# Patient Record
Sex: MALE | URBAN - METROPOLITAN AREA
[De-identification: names, ages, dates, MRNs, and addresses within clinical notes are randomized per-mention and may not be internally consistent; named-entity substitution may affect disease eponyms.]

---

## 2024-09-30 ENCOUNTER — APPOINTMENT (RX ONLY)
Dept: URBAN - METROPOLITAN AREA CLINIC 28 | Facility: CLINIC | Age: 29
Setting detail: DERMATOLOGY
End: 2024-09-30

## 2024-09-30 DIAGNOSIS — Q828 OTHER SPECIFIED ANOMALIES OF SKIN: ICD-10-CM | Status: STABLE

## 2024-09-30 DIAGNOSIS — Q819 OTHER SPECIFIED ANOMALIES OF SKIN: ICD-10-CM | Status: STABLE

## 2024-09-30 DIAGNOSIS — B07.8 OTHER VIRAL WARTS: ICD-10-CM | Status: INADEQUATELY CONTROLLED

## 2024-09-30 DIAGNOSIS — L30.1 DYSHIDROSIS [POMPHOLYX]: ICD-10-CM | Status: STABLE

## 2024-09-30 DIAGNOSIS — Q826 OTHER SPECIFIED ANOMALIES OF SKIN: ICD-10-CM | Status: STABLE

## 2024-09-30 DIAGNOSIS — D22 MELANOCYTIC NEVI: ICD-10-CM

## 2024-09-30 PROBLEM — L85.8 OTHER SPECIFIED EPIDERMAL THICKENING: Status: ACTIVE | Noted: 2024-09-30

## 2024-09-30 PROBLEM — D48.5 NEOPLASM OF UNCERTAIN BEHAVIOR OF SKIN: Status: ACTIVE | Noted: 2024-09-30

## 2024-09-30 PROCEDURE — ? PATIENT EDUCATION

## 2024-09-30 PROCEDURE — ? COUNSELING

## 2024-09-30 PROCEDURE — ? OBSERVATION AND MEASURE

## 2024-09-30 PROCEDURE — ? PHOTO-DOCUMENTATION

## 2024-09-30 PROCEDURE — 99214 OFFICE O/P EST MOD 30 MIN: CPT

## 2024-09-30 ASSESSMENT — LOCATION DETAILED DESCRIPTION DERM
LOCATION DETAILED: LEFT PROXIMAL POSTERIOR UPPER ARM
LOCATION DETAILED: LEFT ANTERIOR PROXIMAL THIGH
LOCATION DETAILED: RIGHT ANTERIOR PROXIMAL THIGH
LOCATION DETAILED: LEFT KNEE
LOCATION DETAILED: LEFT INFERIOR LATERAL UPPER BACK
LOCATION DETAILED: RIGHT PROXIMAL POSTERIOR UPPER ARM

## 2024-09-30 ASSESSMENT — LOCATION SIMPLE DESCRIPTION DERM
LOCATION SIMPLE: LEFT THIGH
LOCATION SIMPLE: RIGHT UPPER ARM
LOCATION SIMPLE: LEFT UPPER BACK
LOCATION SIMPLE: LEFT KNEE
LOCATION SIMPLE: LEFT UPPER ARM
LOCATION SIMPLE: RIGHT THIGH

## 2024-09-30 ASSESSMENT — LOCATION ZONE DERM
LOCATION ZONE: LEG
LOCATION ZONE: TRUNK
LOCATION ZONE: ARM

## 2024-09-30 NOTE — HPI: OTHER
Condition:: Lesion
Please Describe Your Condition:: is an established patient who is being seen for a chief complaint of Lesion. Patient reports a mole on his left mid lateral back that has been there for a long time. He thinks we might have removed it in the past? But it is raised now and he would like her to make sure it is okay. No symptoms reported. His wife says it is the one that looks like a tiny sharpy point that needs to be looked at.
Condition:: Lesions
Please Describe Your Condition:: is an established patient who is being seen for a chief complaint of Lesions. Patient reports some small bumps on the left knee that come and go. He is not sure if they are warts or not. But he has had a history of warts in the past. He has had a habit of scratching and picking at these. He has not treated any of these on his knee area. No symptoms.
Condition:: Possible dyshidrosis
Please Describe Your Condition:: The patient recently had a tiny bump come up on the side of his right index finger that he thinks was stress related, but it went away now and left just a pink spot behind. He does have a history of eczema.
Condition:: Keratosis Pilaris
Please Describe Your Condition:: The patient has a history of eczema and wonders if small bumps on the backs of his arms and the fronts of his proximal thighs are eczema. No symptoms.

## 2024-09-30 NOTE — PROCEDURE: COUNSELING
Detail Level: Detailed
Patient Specific Counseling (Will Not Stick From Patient To Patient): -\\n\\nReviewed that it really does look okay today.. \\nObservation discussed and recommended for now, vs removal. The patient does agree with observation.\\n\\nContinue to observe with his wife and return to the clinic with any changes at all. Reviewed the back is a high risk area for men to get melanoma, so monthly SSEs and consider q year derm FBSE. \\n\\nNo sign of any scar to suggest past removal. Old records are not available today (out for scanning into EMR). Check of [facility]-no pathology reports there. 
Patient Specific Counseling (Will Not Stick From Patient To Patient): -\\n\\nLinear flat flats on the left knee \\n\\nNature reviewed; patient does have a history of warts in the past. \\nReviewed that they can continue to grow or spread even if you decide to treat them or not. \\nOptions discussed observation vs cryo therapy (not recommended) vs topical regimen (salicylic acid/imiquiod cream) Risks and benefits of each option reviewed in detail.  \\nMake sure to use a good moisturizer on the areas where you are prone to getting warts can help. Reviewed that warts can pop up in trauma areas, so make sure that you don’t pull hang nails (he has some hang nails today). \\nThe patient decided not to treat the warts today since they are small and assymptomatic. He'll try to avoid picking and scratching at them to reduce spread risk. He declines Rx imiquimod cream.\\nHe'll call and RTC if these worsen or begin to bother him. Discussed warts often spontaneously resolve, too.
Detail Level: Zone
Patient Specific Counseling (Will Not Stick From Patient To Patient): -\\n\\nNature reviewed \\nNo cure but moisturizers can help with this. \\n\\nReviewed that Keratosis Pilaris, warts and eczema can all run together.
Patient Specific Counseling (Will Not Stick From Patient To Patient): \\nRecent stress trigger discussed. May be able to control with just emollients now. RTC if flares.